# Patient Record
Sex: FEMALE
[De-identification: names, ages, dates, MRNs, and addresses within clinical notes are randomized per-mention and may not be internally consistent; named-entity substitution may affect disease eponyms.]

---

## 2017-04-18 ENCOUNTER — HOSPITAL ENCOUNTER (EMERGENCY)
Dept: HOSPITAL 5 - ED | Age: 37
Discharge: HOME | End: 2017-04-18
Payer: MEDICAID

## 2017-04-18 VITALS — SYSTOLIC BLOOD PRESSURE: 106 MMHG | DIASTOLIC BLOOD PRESSURE: 64 MMHG

## 2017-04-18 DIAGNOSIS — F17.200: ICD-10-CM

## 2017-04-18 DIAGNOSIS — R20.0: Primary | ICD-10-CM

## 2017-04-18 DIAGNOSIS — N20.0: ICD-10-CM

## 2017-04-18 DIAGNOSIS — M54.6: ICD-10-CM

## 2017-04-18 LAB
BILIRUB UR QL STRIP: (no result)
BLOOD UR QL VISUAL: (no result)
KETONES UR STRIP-MCNC: (no result) MG/DL
LEUKOCYTE ESTERASE UR QL STRIP: (no result)
MUCOUS THREADS #/AREA URNS HPF: (no result) /HPF
NITRITE UR QL STRIP: (no result)
PH UR STRIP: 6 [PH] (ref 5–7)
PROT UR STRIP-MCNC: (no result) MG/DL
RBC #/AREA URNS HPF: 6 /HPF (ref 0–6)
UROBILINOGEN UR-MCNC: < 2 MG/DL (ref ?–2)
WBC #/AREA URNS HPF: 1 /HPF (ref 0–6)

## 2017-04-18 PROCEDURE — 36415 COLL VENOUS BLD VENIPUNCTURE: CPT

## 2017-04-18 PROCEDURE — 81001 URINALYSIS AUTO W/SCOPE: CPT

## 2017-04-18 PROCEDURE — 84703 CHORIONIC GONADOTROPIN ASSAY: CPT

## 2017-04-18 PROCEDURE — 96372 THER/PROPH/DIAG INJ SC/IM: CPT

## 2017-04-18 PROCEDURE — 72125 CT NECK SPINE W/O DYE: CPT

## 2017-04-18 PROCEDURE — 72128 CT CHEST SPINE W/O DYE: CPT

## 2017-04-18 PROCEDURE — 99284 EMERGENCY DEPT VISIT MOD MDM: CPT

## 2017-04-18 PROCEDURE — 96374 THER/PROPH/DIAG INJ IV PUSH: CPT

## 2017-04-18 PROCEDURE — 96375 TX/PRO/DX INJ NEW DRUG ADDON: CPT

## 2017-04-18 NOTE — CAT SCAN REPORT
FINAL REPORT



PROCEDURE:  CT THORACIC SPINE WO CON



TECHNIQUE:  Computerized axial tomography of the thoracic spine

was performed from C7 - L1 without contrast material. 



HISTORY:  Pain with radiculopathy 



COMPARISON:  No prior studies are available for comparison.



FINDINGS:  

There are no fractures or malalignments. Disc spaces are normal.

Facet joints are intact.



There is no bony spinal or foraminal stenosis. Facet joints are

intact. Paraspinal soft tissues are unremarkable.



There is a 2 millimeter stone in the midpole of the right kidney.



IMPRESSION:  

There is no acute bony abnormality of the thoracic spine.

## 2017-04-18 NOTE — EMERGENCY DEPARTMENT REPORT
ED Back Pain/Injury HPI





- General


Chief Complaint: Back Pain/Injury


Stated Complaint: BACK PAIN/ARM NUMBNESS X3WKS


Time Seen by Provider: 04/18/17 18:03


Source: patient, family


Mode of arrival: Ambulatory


Limitations: No Limitations





- History of Present Illness


Initial Comments: 





Patient here reported upper back pain between her shoulder blades approximately 

3 weeks and seen by her primary care doctor and has been taking Lortab and 

Motrin.  She is complaining the numbness in her left arm off and on 3 weeks 

she is not having any today.  Denies any injury or trauma.  Pain is 10 out of 

10 between shoulder blades.  She has been managed by her primary care 

physician.  Denies any nausea vomiting.  Denies any neck pain or stiffness.  

Denies any difficulty breathing or chest pain.  Denies any abdominal pain.  

Denies any urinary burning frequency or urgency.  Patient denies any medical 

problem and she has a surgical history of left ankle surgery in the past.


MD Complaint: back pain


Onset/Timing: 3


-: week(s)


Similar Symptoms Previously: No


Place: home


Radiation: none


Severity: severe


Severity scale (0 -10): 10


Quality: aching, other (numbness to her left arm)


Consistency: intermittent


Improves With: immobilization


Worsens With: movement


Context: unknown


Associated Symptoms: denies: confusion, weakness, chest pain, numbness, 

difficulty walking, cough, difficulty urinating, diaphoresis, incontinence, 

fever/chills, constipation, headaches, abdominal pain, loss of appetite, malaise

, nausea/vomiting, rash, seizure, shortness of breath, syncope


Treatments Prior to Arrival: NSAIDS, other medications





- Related Data


 Previous Rx's











 Medication  Instructions  Recorded  Last Taken  Type


 


Ibuprofen [Motrin] 800 mg PO Q8HR PRN #15 tablet 04/18/17 Unknown Rx











 Allergies











Allergy/AdvReac Type Severity Reaction Status Date / Time


 


No Known Allergies Allergy   Verified 04/18/17 18:08














ED Review of Systems


ROS: 


Stated complaint: BACK PAIN/ARM NUMBNESS X3WKS


Other details as noted in HPI





Comment: All other systems reviewed and negative


Constitutional: denies: chills, fever


Eyes: denies: eye pain


ENT: denies: throat pain, epistaxis, congestion


Respiratory: no symptoms reported


Cardiovascular: denies: chest pain, palpitations, edema, syncope


Gastrointestinal: denies: abdominal pain, nausea, vomiting, diarrhea


Genitourinary: denies: urgency, dysuria, frequency, hematuria, discharge, 

abnormal menses, dyspareunia


Musculoskeletal: back pain.  denies: joint swelling, arthralgia


Skin: denies: rash


Neurological: numbness.  denies: headache, weakness, paresthesias, confusion, 

abnormal gait, vertigo





ED Past Medical Hx





- Past Medical History


Previous Medical History?: No





- Surgical History


Past Surgical History?: Yes


Additional Surgical History: LEFT ANKLE SURGERY





- Family History


Family history: no significant





- Social History


Smoking Status: Current Every Day Smoker


Substance Use Type: Prescribed





- Medications


Home Medications: 


 Home Medications











 Medication  Instructions  Recorded  Confirmed  Last Taken  Type


 


Ibuprofen [Motrin] 800 mg PO Q8HR PRN #15 tablet 04/18/17  Unknown Rx














ED Physical Exam





- General


Limitations: No Limitations


General appearance: alert, in no apparent distress





- Head


Head exam: Present: atraumatic, normocephalic, normal inspection





- Eye


Eye exam: Present: normal appearance, PERRL, EOMI.  Absent: scleral icterus, 

conjunctival injection, nystagmus, periorbital swelling, periorbital tenderness


Pupils: Present: normal accommodation





- ENT


ENT exam: Present: normal exam, normal orophraynx, mucous membranes moist, TM's 

normal bilaterally, normal external ear exam





- Neck


Neck exam: Present: normal inspection, full ROM.  Absent: tenderness, 

lymphadenopathy





- Respiratory


Respiratory exam: Present: normal lung sounds bilaterally.  Absent: respiratory 

distress, wheezes, rales, rhonchi, stridor, chest wall tenderness





- Cardiovascular


Cardiovascular Exam: Present: regular rate, normal rhythm, normal heart sounds





- GI/Abdominal


GI/Abdominal exam: Present: soft, normal bowel sounds.  Absent: distended, 

tenderness, guarding, rebound, rigid





- Extremities Exam


Extremities exam: Present: normal inspection, full ROM, normal capillary refill

, other (no neurovascular compromise.  Capillary refill less than 3 seconds.  

No clubbing cyanosis or edema.  Pulses are 2+.  No signs of compartment 

syndrome.  Patient with normal motor and sensory function to extremities.  

Bilateral hand  strong and equal).  Absent: tenderness, pedal edema, joint 

swelling, calf tenderness





- Back Exam


Back exam: Present: normal inspection, full ROM.  Absent: tenderness, CVA 

tenderness (R), CVA tenderness (L), muscle spasm, paraspinal tenderness, 

vertebral tenderness, rash noted





- Neurological Exam


Neurological exam: Present: alert, oriented X3, normal gait, reflexes normal.  

Absent: motor sensory deficit





- Expanded Neurological Exam


  ** Expanded


Neurological exam: Absent: innattentive, memory loss-remote event, memory loss-

recent event, ataxia, receptive aphasia, expressive aphasia, total aphasia, 

tremor, protecting the airway


Patient oriented to: Present: person, place, time


Speech: Present: fluid speech


Cranial nerves: EOM's Intact: Normal, Gag Reflex: Normal, Nystagmus: Normal, 

Facial Sensation: Normal


Cerebellar function: Romberg: Normal


Upper motor neuron: Pronator Drift: Normal, Sensory Extinction: Normal


Sensory exam: Upper Extremity Light Touch: Normal, Upper Extremity Temperature: 

Normal, UE 2 Point Discrimination: Normal, Lower Extremity Light Touch: Normal, 

Lower Extremity Temperature: Normal, LE 2 Point Discrimination: Normal


Motor strength exam: RUE: 5, LUE: 5, RLE: 5, LLE: 5


DTR: bicep (R): 2+, bicep (L): 2+, tricep (R): 2+, tricep (L): 2+, knee (R): 2+

, knee (L): 2+, ankle (R): 2+, ankle (L): 2+


Best Eye Response (Vulcan): (4) open spontaneously


Best Motor Response (Vulcan): (6) obeys commands


Best Verbal Response (Vulcan): (5) oriented


Vulcan Total: 15





- Psychiatric


Psychiatric exam: Present: normal affect, normal mood





- Skin


Skin exam: Present: warm, dry, intact, normal color.  Absent: rash





ED Course


 Vital Signs











  04/18/17 04/18/17 04/18/17





  18:02 20:34 20:35


 


Temperature 98.2 F  


 


Pulse Rate 105 H  


 


Respiratory 16 20 20





Rate   


 


Blood Pressure 132/92  


 


O2 Sat by Pulse 99  





Oximetry   














 Vital Signs











  04/18/17 04/18/17 04/18/17





  18:02 20:34 20:35


 


Temperature 98.2 F  


 


Pulse Rate 105 H  


 


Respiratory 16 20 20





Rate   


 


Blood Pressure 132/92  


 


Blood Pressure   





[Right]   


 


O2 Sat by Pulse 99  





Oximetry   














  04/18/17 04/18/17





  21:37 22:15


 


Temperature  


 


Pulse Rate  67


 


Respiratory 20 18





Rate  


 


Blood Pressure  


 


Blood Pressure  106/64





[Right]  


 


O2 Sat by Pulse  96





Oximetry  














- Reevaluation(s)


Reevaluation #1: 





04/18/17 21:02


Given Norco 5/325 2 tablets, Toradol 60 mg IM along with Decadron 10 mg IM.  

She said her pain was better but then it came back.  Patient noted to be crying.


Reevaluation #2: 





04/18/17 22:46


Patient given additional 4 mg Zofran IM for nausea.





ED Medical Decision Making





- Lab Data








 Lab Results











  04/18/17 04/18/17 Range/Units





  19:54 20:55 


 


HCG, Qual  Negative   (Negative)  


 


Urine Color   Yellow  (Yellow)  


 


Urine Turbidity   Clear  (Clear)  


 


Urine pH   6.0  (5.0-7.0)  


 


Ur Specific Gravity   1.023  (1.003-1.030)  


 


Urine Protein   <15 mg/dl  (Negative)  mg/dL


 


Urine Glucose (UA)   Neg  (Negative)  mg/dL


 


Urine Ketones   Neg  (Negative)  mg/dL


 


Urine Blood   Mod  (Negative)  


 


Urine Nitrite   Neg  (Negative)  


 


Urine Bilirubin   Neg  (Negative)  


 


Urine Urobilinogen   < 2.0  (<2.0)  mg/dL


 


Ur Leukocyte Esterase   Neg  (Negative)  


 


Urine WBC (Auto)   1.0  (0.0-6.0)  /HPF


 


Urine RBC (Auto)   6.0  (0.0-6.0)  /HPF


 


U Epithel Cells (Auto)   1.0  (0-13.0)  /HPF


 


Urine Mucus   Few  /HPF














- Radiology Data


Radiology results: report reviewed





CT scan of the thoracic spine revealed normal exam except 2 mm stone in the mid 

pole of the right kidney


CAT scan of the cervical spine revealed no abnormalities.





- Medical Decision Making


I collaborated with Dr. Treviño  regarding patient presentation, clinical 

findings, diagnostic and lab findings.  I also told them the patient has a 

kidney stone and right kidney and is located patient follow-up outpatient 

urology.


Patient here complaining of radiculopathic pain.  And treated by her primary 

care physician for this for the past 2 weeks and taking Lortab and Motrin.  

Neurovascular intact, neurologically intact.  I described to patient that she 

will need to follow-up with neurologist and orthopedic doctor regarding her 

pain.  Said discussed the results of her CT scans and told her that there are 

no abnormality and there was a 2 cm stone in her right mid pole kidney.  I 

discussed with her that she'll need to follow-up with urologist.  Patient is 

urinating well without any hematuria and she is not having any flank pain.  PT 

given Norco 5/325 2 tablets, Decadron 10 mg im and Toradol 60 mg IM.  Pain was 

relieved briefly in she started complaining of pain.  Was given an additional 1 

mg of Dilaudid IM, Zofran 4 mg ODT and Valium 10 mg by mouth. She was  Also 

given another dose of Zofran 4 mg IM.  Patient underwent was understanding of 

discharge diagnosis, follow-up and treatment plan.  I told her she can continue 

to take her Lortab as prescribed by her doctor and I will add prescription 

strength Motrin.


Critical care attestation.: 


If time is entered above; I have spent that time in minutes in the direct care 

of this critically ill patient, excluding procedure time.








ED Disposition


Clinical Impression: 


 Arm paresthesia, left, Right kidney stone





Midline thoracic back pain


Qualifiers:


 Chronicity: unspecified Qualified Code(s): M54.6 - Pain in thoracic spine





Radiculopathy


Qualifiers:


 Spinal region: unspecified Qualified Code(s): M54.10 - Radiculopathy, site 

unspecified





Disposition: DISCHARGED TO HOME OR SELFCARE


Is pt being admited?: No


Does the pt Need Aspirin: No


Condition: Stable


Instructions:  Back Pain (ED), Paresthesia (ED), Kidney Stones (ED)


Additional Instructions: 


Please follow up with neurologist and orthopedic doctor regarding back pain.  

Numbness to left arm.


Follow-up with urologist regarding right kidney stone.


He can continue to take Lortab as prescribed by Dr. Farris member not to drive 

or operate heavy machinery as medication can cause drowsiness.


Prescriptions: 


Ibuprofen [Motrin] 800 mg PO Q8HR PRN #15 tablet


 PRN Reason: Pain


Referrals: 


PRIMARY CARE,MD [Primary Care Provider] - 04/20/17


MILI MONAHAN MD [Staff Physician] - 04/20/17


BEBE QUEEN MD [Staff Physician] - 04/20/17


ANGE SEWELL MD [Staff Physician] - 2-3 Days


Forms:  Accompanied Note, Work/School Release Form(ED)

## 2017-04-18 NOTE — EMERGENCY DEPARTMENT REPORT
Entered by LEONARD SORIA, acting as scribe for ETTA MARIE NP.


Chief Complaint: Back Pain/Injury


Stated Complaint: UPPER BACK PAIN/ARM NUMBNESS X3WKS


Time Seen by Provider: 04/18/17 18:00





- HPI


History of Present Illness: 


36 y/o female presents c/o 10/10, sharp upper back/shoulder pain that started 3 

weeks ago. Associated Sx include intermittent left arm numbness. Pt denies any 

trauma to the area.  








- ROS


Review of Systems: 








+upper back/shoulder pain


+left arm numbness








- Exam


Vital Signs: 


 Vital Signs











  04/18/17





  18:02


 


Temperature 98.2 F


 


Pulse Rate 105 H


 


Respiratory 16





Rate 


 


Blood Pressure 132/92


 


O2 Sat by Pulse 99





Oximetry 











Physical Exam: 





PT disheveled, tearful


pt reports pain to mid back.  


MSE screening note: 


Focused history and physical exam performed.


Due to findings the following was ordered:











ED Disposition for MSE


Condition: Stable





This documentation as recorded by the scribe,LEONARD SORIA,accurately reflects 

the service I personally performed and the decisions made by CYNTHIA pena TRACY M NP.

## 2017-04-18 NOTE — CAT SCAN REPORT
FINAL REPORT



PROCEDURE:  CT CERVICAL SPINE WO CON



TECHNIQUE:  Computerized tomography of the cervical spine was

performed from the skull base to T1 without contrast material. 



HISTORY:  Pain with radiculopathy 



COMPARISON:  No prior studies are available for comparison.



FINDINGS:  

C1-2: No significant abnormality.



C2-3: No significant abnormality.



C3-4: No significant abnormality.



C4-5: No significant abnormality.



C5-6: No significant abnormality.



C6-7: No significant abnormality.



C7-T1: No significant abnormality.



Other: There are no fractures or malalignments. Prevertebral soft

tissues are normal in thickness..



IMPRESSION:  

No significant abnormality.

## 2019-06-11 ENCOUNTER — HOSPITAL ENCOUNTER (EMERGENCY)
Dept: HOSPITAL 5 - ED | Age: 39
Discharge: HOME | End: 2019-06-11
Payer: MEDICAID

## 2019-06-11 VITALS — DIASTOLIC BLOOD PRESSURE: 85 MMHG | SYSTOLIC BLOOD PRESSURE: 140 MMHG

## 2019-06-11 DIAGNOSIS — S62.524A: Primary | ICD-10-CM
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
## 2019-06-11 DIAGNOSIS — F17.200: ICD-10-CM
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
## 2019-06-11 NOTE — XRAY REPORT
RIGHT THUMB, 3 views:



History: Thumb injury.



A nondisplaced tuft fracture is identified in the distal right thumb. 

The remaining bony structures and joint spaces are within normal 

limits. No soft tissue foreign body.



IMPRESSION:

Tuft fracture, right thumb.

## 2019-06-11 NOTE — EMERGENCY DEPARTMENT REPORT
Blank Doc





- Documentation


Documentation: 





39 y o female presents to Ed cc of right thumb pain s/p hitting it while lawn 

mowing


swelling and echymosis noted to thumb


xr


Acc eval

## 2019-06-11 NOTE — EMERGENCY DEPARTMENT REPORT
HPI





- General


Chief Complaint: Extremity Injury, Upper


Time Seen by Provider: 06/11/19 14:18





- HPI


HPI: 





Patient is a 39-year-old female who comes to the ER after injuring her right 

thumb on the lawnmower today.  She has full range of motion.  There is no 

subungual hematoma.  There is soft tissue swelling of the distal finger.  There 

is rapid capillary refill and radial and ulnar pulses are intact.  Patient 

denies any other injury.  She did not take anything prior to arrival to make her

pain better if she just came to the emergency room for evaluation.





ED Past Medical Hx





- Past Medical History


Previous Medical History?: No





- Surgical History


Past Surgical History?: Yes


Additional Surgical History: LEFT ANKLE SURGERY





- Social History


Smoking Status: Current Every Day Smoker





- Medications


Home Medications: 


                                Home Medications











 Medication  Instructions  Recorded  Confirmed  Last Taken  Type


 


traMADol [Ultram] 50 mg PO Q6HR PRN #10 tablet 06/11/19  Unknown Rx














ED Review of Systems


ROS: 


Stated complaint: RT THUMB FINGER INJURY/PAIN


Other details as noted in HPI





Comment: All other systems reviewed and negative





Physical Exam





- Physical Exam


Vital Signs: 


                                   Vital Signs











  06/11/19





  14:17


 


Temperature 98.2 F


 


Pulse Rate 96 H


 


Respiratory 18





Rate 


 


Blood Pressure 140/85


 


O2 Sat by Pulse 99





Oximetry 











Physical Exam: 





WDWN patient in NAD


VS per RN flow sheet


Alert and oriented to person, place and time. 


S1-S2.  No S3 or S4.  No systolic or diastolic murmur.  No JVD.  No pitting 

edema.


Lungs clear to auscultation bilaterally anteriorly and posteriorly.


Abdomen soft nontender bowel sounds x4


Moves all extremities well.


Mood and affect appropriate. 








ED Course


                                   Vital Signs











  06/11/19





  14:17


 


Temperature 98.2 F


 


Pulse Rate 96 H


 


Respiratory 18





Rate 


 


Blood Pressure 140/85


 


O2 Sat by Pulse 99





Oximetry 














ED Medical Decision Making





- Radiology Data


Radiology results: report reviewed, image reviewed





- Medical Decision Making





X-ray negative.  No subungual hematoma. neurovascularly intact.





medicated for pain





splint to finger 





dc home with dc plan of care





                                   Vital Signs











  06/11/19





  14:17


 


Temperature 98.2 F


 


Pulse Rate 96 H


 


Respiratory 18





Rate 


 


Blood Pressure 140/85


 


O2 Sat by Pulse 99





Oximetry 














- Differential Diagnosis


ro fracture


Critical care attestation.: 


If time is entered above; I have spent that time in minutes in the direct care 

of this critically ill patient, excluding procedure time.








ED Disposition


Clinical Impression: 


 Closed fracture of tuft of distal phalanx of finger





Disposition: DC-01 TO HOME OR SELFCARE


Is pt being admited?: No


Does the pt Need Aspirin: No


Condition: Stable


Instructions:  Finger Fracture (ED)


Additional Instructions: 


ice





rest 





elevate





motrin or tylenol for mild pain





follow up with Dr Wright next week








Prescriptions: 


traMADol [Ultram] 50 mg PO Q6HR PRN #10 tablet


 PRN Reason: Pain


Referrals: 


KOBE LU MD [Primary Care Provider] - 3-5 Days


Time of Disposition: 17:37

## 2020-04-08 ENCOUNTER — HOSPITAL ENCOUNTER (EMERGENCY)
Dept: HOSPITAL 5 - ED | Age: 40
Discharge: HOME | End: 2020-04-08
Payer: MEDICAID

## 2020-04-08 VITALS — DIASTOLIC BLOOD PRESSURE: 76 MMHG | SYSTOLIC BLOOD PRESSURE: 117 MMHG

## 2020-04-08 DIAGNOSIS — J18.9: Primary | ICD-10-CM

## 2020-04-08 DIAGNOSIS — Z03.818: ICD-10-CM

## 2020-04-08 LAB
ALBUMIN SERPL-MCNC: 3.6 G/DL (ref 3.9–5)
ALT SERPL-CCNC: 9 UNITS/L (ref 7–56)
BASOPHILS # (AUTO): 0 K/MM3 (ref 0–0.1)
BASOPHILS NFR BLD AUTO: 0.2 % (ref 0–1.8)
BUN SERPL-MCNC: 8 MG/DL (ref 7–17)
BUN/CREAT SERPL: 10 %
CALCIUM SERPL-MCNC: 9.1 MG/DL (ref 8.4–10.2)
CRP SERPL-MCNC: 7.5 MG/DL (ref 0–1.3)
EOSINOPHIL # BLD AUTO: 0.1 K/MM3 (ref 0–0.4)
EOSINOPHIL NFR BLD AUTO: 0.5 % (ref 0–4.3)
HCT VFR BLD CALC: 42.7 % (ref 30.3–42.9)
HEMOLYSIS INDEX: 6
HGB BLD-MCNC: 14.5 GM/DL (ref 10.1–14.3)
LYMPHOCYTES # BLD AUTO: 1.9 K/MM3 (ref 1.2–5.4)
LYMPHOCYTES NFR BLD AUTO: 17.2 % (ref 13.4–35)
MCHC RBC AUTO-ENTMCNC: 34 % (ref 30–34)
MCV RBC AUTO: 92 FL (ref 79–97)
MONOCYTES # (AUTO): 0.8 K/MM3 (ref 0–0.8)
MONOCYTES % (AUTO): 7.4 % (ref 0–7.3)
PLATELET # BLD: 271 K/MM3 (ref 140–440)
RBC # BLD AUTO: 4.63 M/MM3 (ref 3.65–5.03)

## 2020-04-08 PROCEDURE — 80053 COMPREHEN METABOLIC PANEL: CPT

## 2020-04-08 PROCEDURE — 85379 FIBRIN DEGRADATION QUANT: CPT

## 2020-04-08 PROCEDURE — 87400 INFLUENZA A/B EACH AG IA: CPT

## 2020-04-08 PROCEDURE — 85025 COMPLETE CBC W/AUTO DIFF WBC: CPT

## 2020-04-08 PROCEDURE — 71045 X-RAY EXAM CHEST 1 VIEW: CPT

## 2020-04-08 PROCEDURE — 96365 THER/PROPH/DIAG IV INF INIT: CPT

## 2020-04-08 PROCEDURE — 86140 C-REACTIVE PROTEIN: CPT

## 2020-04-08 PROCEDURE — 99284 EMERGENCY DEPT VISIT MOD MDM: CPT

## 2020-04-08 PROCEDURE — 83615 LACTATE (LD) (LDH) ENZYME: CPT

## 2020-04-08 PROCEDURE — 84145 PROCALCITONIN (PCT): CPT

## 2020-04-08 PROCEDURE — 36415 COLL VENOUS BLD VENIPUNCTURE: CPT

## 2020-04-08 PROCEDURE — 96366 THER/PROPH/DIAG IV INF ADDON: CPT

## 2020-04-08 PROCEDURE — 82728 ASSAY OF FERRITIN: CPT

## 2020-04-08 PROCEDURE — 96361 HYDRATE IV INFUSION ADD-ON: CPT

## 2020-04-08 NOTE — EMERGENCY DEPARTMENT REPORT
ED General Adult HPI





- General


Chief complaint: Fever


Stated complaint: FEVER/ILLNESS


Time Seen by Provider: 04/08/20 15:23


Source: patient


Mode of arrival: Ambulatory


Limitations: No Limitations





- History of Present Illness


Initial comments: 





39-year-old  female presents to the ER complaining of fatigue, body 

aches, cough, nausea vomiting, shortness of breath and fever x3 days.  Patient 

also admits to sore throat diarrhea.  Patient denies any sick contact.  Patient 

reports no past medical history does not take any medications on a daily basis 

and has no known drug allergies.  Patient reports her last menstrual period was 

4/1/2020.


Onset/Timing: 3


-: days(s)


Severity scale (0 -10): 7


Quality: aching (All over)


Associated Symptoms: cough, diaphoresis, fever/chills, headaches, loss of 

appetite, malaise, shortness of breath.  denies: nausea/vomiting


Treatments Prior to Arrival: none





- Related Data


                                  Previous Rx's











 Medication  Instructions  Recorded  Last Taken  Type


 


traMADoL [Ultram] 50 mg PO Q6HR PRN #10 tablet 06/11/19 Unknown Rx


 


Amoxicillin [Trimox CAP] 500 mg PO Q8H #30 capsule 04/08/20 Unknown Rx


 


Azithromycin [Zithromax Z-CRYSTAL] 250 mg PO DAILY #6 tab 04/08/20 Unknown Rx











                                    Allergies











Allergy/AdvReac Type Severity Reaction Status Date / Time


 


No Known Allergies Allergy   Verified 06/11/19 14:16














ED Review of Systems


ROS: 


Stated complaint: FEVER/ILLNESS


Other details as noted in HPI





Comment: All other systems reviewed and negative





ED Past Medical Hx





- Past Medical History


Previous Medical History?: No





- Surgical History


Past Surgical History?: No


Additional Surgical History: LEFT ANKLE SURGERY





- Social History


Smoking Status: Current Every Day Smoker


Substance Use Type: None





- Medications


Home Medications: 


                                Home Medications











 Medication  Instructions  Recorded  Confirmed  Last Taken  Type


 


traMADoL [Ultram] 50 mg PO Q6HR PRN #10 tablet 06/11/19  Unknown Rx


 


Amoxicillin [Trimox CAP] 500 mg PO Q8H #30 capsule 04/08/20  Unknown Rx


 


Azithromycin [Zithromax Z-CRYSTAL] 250 mg PO DAILY #6 tab 04/08/20  Unknown Rx














ED Physical Exam





- General


Limitations: No Limitations


General appearance: alert, in distress





- Head


Head exam: Present: atraumatic, normocephalic





- Eye


Eye exam: Present: normal appearance





- ENT


ENT exam: Present: mucous membranes dry





- Neck


Neck exam: Present: normal inspection, full ROM





- Respiratory


Respiratory exam: Present: decreased breath sounds (Basis)





- Cardiovascular


Cardiovascular Exam: Present: tachycardia





- GI/Abdominal


GI/Abdominal exam: Present: soft, normal bowel sounds.  Absent: distended, 

tenderness





- Extremities Exam


Extremities exam: Present: full ROM.  Absent: tenderness, pedal edema, joint 

swelling





- Back Exam


Back exam: Present: normal inspection, full ROM.  Absent: tenderness





- Neurological Exam


Neurological exam: Present: alert, oriented X3





- Psychiatric


Psychiatric exam: Present: normal affect, normal mood





- Skin


Skin exam: Present: warm, dry, intact, normal color.  Absent: rash





ED Course


                                   Vital Signs











  04/08/20 04/08/20 04/08/20





  15:16 16:30 16:54


 


Temperature 100.7 F H  


 


Pulse Rate 122 H  111 H


 


Respiratory 20 22 33 H





Rate   


 


Blood Pressure 133/91  


 


O2 Sat by Pulse 96 98 98





Oximetry   














  04/08/20 04/08/20 04/08/20





  16:56 16:58 17:00


 


Temperature   


 


Pulse Rate 107 H 107 H 107 H


 


Respiratory 24 19 18





Rate   


 


Blood Pressure 114/79 114/79 114/79


 


O2 Sat by Pulse 99 98 100





Oximetry   














  04/08/20 04/08/20 04/08/20





  17:02 17:04 17:06


 


Temperature   


 


Pulse Rate   


 


Respiratory   





Rate   


 


Blood Pressure 127/70 127/70 127/70


 


O2 Sat by Pulse 98 99 99





Oximetry   














  04/08/20 04/08/20 04/08/20





  17:08 17:10 17:12


 


Temperature   


 


Pulse Rate   


 


Respiratory   





Rate   


 


Blood Pressure 127/70 127/70 127/70


 


O2 Sat by Pulse 100 100 100





Oximetry   














  04/08/20 04/08/20 04/08/20





  17:14 17:15 17:16


 


Temperature   


 


Pulse Rate   


 


Respiratory   





Rate   


 


Blood Pressure 127/70 126/69 126/69


 


O2 Sat by Pulse 100 100 100





Oximetry   














  04/08/20 04/08/20 04/08/20





  17:18 17:20 17:22


 


Temperature   


 


Pulse Rate   


 


Respiratory   





Rate   


 


Blood Pressure 126/69 126/69 126/69


 


O2 Sat by Pulse 99 100 98





Oximetry   














  04/08/20 04/08/20 04/08/20





  17:24 17:26 17:28


 


Temperature   


 


Pulse Rate   


 


Respiratory   





Rate   


 


Blood Pressure 126/69 126/69 126/69


 


O2 Sat by Pulse 99 99 99





Oximetry   














  04/08/20 04/08/20 04/08/20





  17:30 17:32 17:34


 


Temperature   


 


Pulse Rate   


 


Respiratory   





Rate   


 


Blood Pressure 126/69 114/61 114/61


 


O2 Sat by Pulse 100 100 100





Oximetry   














  04/08/20 04/08/20 04/08/20





  17:36 17:38 17:40


 


Temperature   


 


Pulse Rate   


 


Respiratory   





Rate   


 


Blood Pressure   


 


O2 Sat by Pulse 98 99 99





Oximetry   














  04/08/20 04/08/20 04/08/20





  17:42 17:44 17:45


 


Temperature   


 


Pulse Rate   


 


Respiratory   





Rate   


 


Blood Pressure   120/63


 


O2 Sat by Pulse 99 99 99





Oximetry   














  04/08/20 04/08/20 04/08/20





  17:46 17:48 17:50


 


Temperature   


 


Pulse Rate   


 


Respiratory   





Rate   


 


Blood Pressure 114/61 114/61 114/61


 


O2 Sat by Pulse 99 98 99





Oximetry   














  04/08/20 04/08/20 04/08/20





  17:52 17:54 17:56


 


Temperature   


 


Pulse Rate   


 


Respiratory   





Rate   


 


Blood Pressure 114/61 114/61 114/61


 


O2 Sat by Pulse 98 99 99





Oximetry   














  04/08/20





  18:04


 


Temperature 


 


Pulse Rate 102 H


 


Respiratory 20





Rate 


 


Blood Pressure 


 


O2 Sat by Pulse 





Oximetry 














ED Medical Decision Making





- Lab Data


Result diagrams: 


                                 04/08/20 15:41





                                 04/08/20 15:41








                         Laboratory Results - last 72 hr











  04/08/20 04/08/20 04/08/20





  15:41 15:41 15:41


 


WBC  11.0  


 


RBC  4.63  


 


Hgb  14.5 H  


 


Hct  42.7  


 


MCV  92  


 


MCH  31  


 


MCHC  34  


 


RDW  13.0 L  


 


Plt Count  271  


 


Lymph % (Auto)  17.2  


 


Mono % (Auto)  7.4 H  


 


Eos % (Auto)  0.5  


 


Baso % (Auto)  0.2  


 


Lymph #  1.9  


 


Mono #  0.8  


 


Eos #  0.1  


 


Baso #  0.0  


 


Seg Neutrophils %  74.7 H  


 


Seg Neutrophils #  8.2 H  


 


D-Dimer   295.35 H 


 


Sodium    136 L


 


Potassium    4.1


 


Chloride    98.2


 


Carbon Dioxide    22


 


Anion Gap    20


 


BUN    8


 


Creatinine    0.8


 


Estimated GFR    > 60


 


BUN/Creatinine Ratio    10


 


Glucose    103 H


 


Calcium    9.1


 


Ferritin   


 


Total Bilirubin    0.60


 


AST    11


 


ALT    9


 


Alkaline Phosphatase    86


 


Lactate Dehydrogenase    117


 


C-Reactive Protein    7.50 H


 


Total Protein    7.2


 


Albumin    3.6 L


 


Albumin/Globulin Ratio    1.0


 


Influenza A (Rapid)   


 


Influenza B (Rapid)   














  04/08/20 04/08/20





  15:41 16:59


 


WBC  


 


RBC  


 


Hgb  


 


Hct  


 


MCV  


 


MCH  


 


MCHC  


 


RDW  


 


Plt Count  


 


Lymph % (Auto)  


 


Mono % (Auto)  


 


Eos % (Auto)  


 


Baso % (Auto)  


 


Lymph #  


 


Mono #  


 


Eos #  


 


Baso #  


 


Seg Neutrophils %  


 


Seg Neutrophils #  


 


D-Dimer  


 


Sodium  


 


Potassium  


 


Chloride  


 


Carbon Dioxide  


 


Anion Gap  


 


BUN  


 


Creatinine  


 


Estimated GFR  


 


BUN/Creatinine Ratio  


 


Glucose  


 


Calcium  


 


Ferritin  144.2 


 


Total Bilirubin  


 


AST  


 


ALT  


 


Alkaline Phosphatase  


 


Lactate Dehydrogenase  


 


C-Reactive Protein  


 


Total Protein  


 


Albumin  


 


Albumin/Globulin Ratio  


 


Influenza A (Rapid)   Negative


 


Influenza B (Rapid)   Negative














- Medical Decision Making





39-year-old  female presents to the ER complaining of fatigue, body 

aches, cough, nausea vomiting, shortness of breath and fever x3 days.  Patient 

also admits to sore throat diarrhea.  Patient denies any sick contact.  Patient 

reports no past medical history does not take any medications on a daily basis 

and has no known drug allergies.  Patient reports her last menstrual period was 

4/1/2020.








Chest x-ray CBC CMP acetaminophen IV insertion normal saline.Covid-19 orders 

have been placed.  Patient be given albuterol treatment


Critical care attestation.: 


If time is entered above; I have spent that time in minutes in the direct care 

of this critically ill patient, excluding procedure time.








ED Disposition


Clinical Impression: 


 Pneumonia, Suspected COVID-19 virus infection





Disposition: DC-01 TO HOME OR SELFCARE


Is pt being admited?: No


Does the pt Need Aspirin: No


Condition: Stable


Instructions:  Bacterial Pneumonia (ED)


Additional Instructions: 


Please complete antibiotics as prescribed.  Return to the emergency room if you 

developing worse shortness of breath chest pain diarrhea sore throat fever.  

Continue with Tylenol or ibuprofen as needed for fever and pain.  Please follow-

up with a primary care provider I am referring you to Dr. Toshia Presley and he is a

ble to refer you for Covid testing.  You are being placed on a 14-day 

quarantine.  That means you do not leave your house for 14 days that means that 

you do not have contact with any other persons.  Increase your fluid intake.


Prescriptions: 


Amoxicillin [Trimox CAP] 500 mg PO Q8H #30 capsule


Azithromycin [Zithromax Z-CRYSTAL] 250 mg PO DAILY #6 tab


Referrals: 


PRIMARY CARE,MD [Primary Care Provider] - 3-5 Days


KOBE LU MD [Staff Physician] - 3-5 Days


Forms:  Work/School Release Form(ED)

## 2020-04-08 NOTE — XRAY REPORT
CHEST 1 VIEW



INDICATION: 

MAIN: Cough, shortness of breath; Pt. c/o fatigue, body aches cough, N/V, SOB, and fever x 3 days.  N
o PMH.  Temp 100.7 in triage. .



COMPARISON: 

None.



FINDINGS:



Support devices: None.



Heart: Normal. 



Lungs/Pleura: No acute pulmonary or pleural findings.  







IMPRESSION:

1. No acute findings.



Signer Name: Davi Lopez MD 

Signed: 4/8/2020 4:34 PM

Workstation Name: [id number]

## 2020-04-08 NOTE — EVENT NOTE
ED Screening Note


Date of service: 04/08/20


Time: 15:25


ED Screening Note: 


39-year-old  female presents to the emergency room for shortness of 

breath fever tachycardic and overall malaise.





This initial assessment/diagnostic orders/clinical plan/treatment(s) is/are 

subject to change based on patients health status, clinical progression and re-

assessment by fellow clinical providers in the ED. Further treatment and workup 

at subsequent clinical providers discretion. Patient/guardian urged not to elope

from the ED as their condition may be serious if not clinically assessed and 

managed. 





Initial orders include: 


Chest x-ray, CBC, CMP, cold fit additional orders, acetaminophen, IV insertion, 

normal saline

## 2022-01-01 ENCOUNTER — HOSPITAL ENCOUNTER (EMERGENCY)
Dept: HOSPITAL 5 - ED | Age: 42
LOS: 1 days | Discharge: HOME | End: 2022-01-02
Payer: MEDICAID

## 2022-01-01 DIAGNOSIS — R69: Primary | ICD-10-CM

## 2022-01-01 DIAGNOSIS — Z20.822: ICD-10-CM
